# Patient Record
Sex: FEMALE | Race: WHITE | Employment: OTHER | URBAN - METROPOLITAN AREA
[De-identification: names, ages, dates, MRNs, and addresses within clinical notes are randomized per-mention and may not be internally consistent; named-entity substitution may affect disease eponyms.]

---

## 2017-04-07 DIAGNOSIS — I10 ESSENTIAL HYPERTENSION: ICD-10-CM

## 2017-04-07 DIAGNOSIS — I47.1 PAROXYSMAL SVT (SUPRAVENTRICULAR TACHYCARDIA) (HCC): ICD-10-CM

## 2017-04-07 DIAGNOSIS — I48.92 PAROXYSMAL ATRIAL FLUTTER (HCC): ICD-10-CM

## 2017-04-10 RX ORDER — FLECAINIDE ACETATE 50 MG/1
TABLET ORAL
Qty: 180 TAB | Refills: 3 | Status: SHIPPED | OUTPATIENT
Start: 2017-04-10 | End: 2018-03-24 | Stop reason: SDUPTHER

## 2017-05-24 ENCOUNTER — OFFICE VISIT (OUTPATIENT)
Dept: CARDIOLOGY CLINIC | Age: 82
End: 2017-05-24

## 2017-05-24 ENCOUNTER — CLINICAL SUPPORT (OUTPATIENT)
Dept: CARDIOLOGY CLINIC | Age: 82
End: 2017-05-24

## 2017-05-24 VITALS
HEIGHT: 67 IN | SYSTOLIC BLOOD PRESSURE: 156 MMHG | OXYGEN SATURATION: 98 % | HEART RATE: 61 BPM | DIASTOLIC BLOOD PRESSURE: 84 MMHG | BODY MASS INDEX: 23.23 KG/M2 | WEIGHT: 148 LBS | RESPIRATION RATE: 16 BRPM

## 2017-05-24 DIAGNOSIS — I10 ESSENTIAL HYPERTENSION: ICD-10-CM

## 2017-05-24 DIAGNOSIS — I48.92 PAROXYSMAL ATRIAL FLUTTER (HCC): ICD-10-CM

## 2017-05-24 DIAGNOSIS — I77.9 CAROTID DISEASE, BILATERAL (HCC): ICD-10-CM

## 2017-05-24 DIAGNOSIS — I47.1 PAROXYSMAL SVT (SUPRAVENTRICULAR TACHYCARDIA) (HCC): ICD-10-CM

## 2017-05-24 DIAGNOSIS — I11.9 BENIGN HYPERTENSIVE HEART DISEASE WITHOUT HEART FAILURE: Primary | ICD-10-CM

## 2017-05-24 NOTE — PATIENT INSTRUCTIONS
Please continue your Felcainide. Notify us if you begin to have an increase of palpitations or tachycardia. Please continue to keep yourself abreast of the research about ASA vs anticoagulation. Continue to follow up with Dr. Nereida Rodríguez about your nausea. Perhaps discuss the benefit of a GI evaluation. Consider seeing an ENT doctor to see if your dizziness is vertigo. Dr. Aguilera Backbone  Address: [facility] [address] [place] [address]   Phone: [phone]    We will see you again in 6 months, but please call us with any questions or concerns prior to your next office visit.

## 2017-05-24 NOTE — PROGRESS NOTES
[id number] [facility], [address] [facility]    Office [phone]  Fax [phone]      HISTORY OF PRESENT ILLNESS  Meggan Crowley is a [de-identified] y.o. female. Last seen 6 months ago. Problem List  Date Reviewed: 11/15/2016          Codes Class Noted    Essential hypertension ICD-10-CM: I10  ICD-9-CM: 401.9  1/7/2016        Paroxysmal atrial flutter ([address] ICD-10-CM: I48.92  ICD-9-CM: 427.32  9/29/2014        Carotid disease, bilateral ([address] ICD-10-CM: I77.9  ICD-9-CM: 447.9  3/27/2014        Paroxysmal SVT (supraventricular tachycardia) (HCC) ICD-10-CM: I47.1  ICD-9-CM: 427.0  3/23/2014        Benign hypertensive heart disease without heart failure ICD-10-CM: I11.9  ICD-9-CM: 402.10  Unknown             Cardiac testing:  Carotid duplex 5157 - GARRETT 40-62%, LICA 08-16%  Stress echo 2007 - 4:30, normal study  Carotid duplex 08/70/00 - 44-25% GARRETT, LICA 78-52%  Stress Echo 3/4/13 - normal study after submaximal exercise, diagnostic sensitivity limited by submaximal stress  Carotid 3/22/14-Occlusion and stenosis of carotid artery without mention of cerebral infarction: POA repeat bilateral artery doppler Consistent with <50% stenosis of the right internal carotid and 50-69% stenosis of the left internal carotid. Event Recorder 4/28/14, returned 6/3/14 - intermittent atrial flutter up to 151 bpm, PVCs  Echo 6/27/14: EF 65-70%; Left atrium - upper limits of normal.    Carotid duplex 3/9/44 - GARRETT 62-22%, LICA 45%, unchanged from 2014  Echo 4/12/16 - LVEF 65-70%, moderate MAC, mild MR, mild to moderate LAE  Carotid duplex 8/81/30 - GARRETT 20-58%, LICA 74%. Unchanged. HPI  Ms. Bill Hansen primary complaint today is nausea, abdominal pain and fatigue. She attributes these issues to a recent Lupus arthritis flare for which she required the use of Prednisone. She reports frequent follow up with Dr. Vishnu Del Valle to address these issues. She denies any progression in baseline dyspnea with moderate activity. She continues to report dizziness with position change. She denies any falls. Using a cane for stability. She denies any exertional anginal symptoms. She reports edema in her legs and ankles; worsens as the day progresses. She notes intermittent palpitations. No tachycardia. She denies syncope or near syncope. No orthopnea, or paroxysmal nocturnal dyspnea. Cardiac risk factors   HTN yes  DM  no  Smoking no; hx of tobacco.   Family hx of CAD no    Current Outpatient Prescriptions   Medication Sig Dispense Refill    flecainide (TAMBOCOR) 50 mg tablet TAKE 1 TABLET BY MOUTH TWICE DAILY 180 Tab 3    levothyroxine (SYNTHROID) 75 mcg tablet Take  by mouth Daily (before breakfast).  losartan (COZAAR) 100 mg tablet TAKE 1 TABLET BY MOUTH DAILY 90 Tab 3    omeprazole (PRILOSEC) 40 mg capsule Take 40 mg by mouth two (2) times a day.  propranolol LA (INDERAL LA) 160 mg capsule Take 160 mg by mouth daily.  ASPIRIN/DIPYRIDAMOLE (AGGRENOX PO) Take  by mouth two (2) times a day. 25-200mg Sr 12h       lorazepam (ATIVAN) 1 mg tablet Take 1 mg by mouth two (2) times a day.  acetaminophen (TYLENOL EXTRA STRENGTH) 500 mg tablet Take  by mouth every six (6) hours as needed for Pain. Allergies   Allergen Reactions    Cephalosporins Unknown (comments)    Flagyl [Metronidazole] Unknown (comments)    Iodine Unknown (comments)    Ivp Dye [Fd And C Blue No.1] Unknown (comments)    Keflex [Cephalexin] Unknown (comments)    Levaquin [Levofloxacin] Unknown (comments)    Morphine Other (comments)     Hallucinations, \"crazy\"    Pcn [Penicillins] Unknown (comments)    Sulfa (Sulfonamide Antibiotics) Unknown (comments)    Tetanus Vaccines And Toxoid Unknown (comments)    Tetracycline Unknown (comments)     Social History     Social History Narrative    . Remote smoker     Review of Systems   Constitutional: Negative for fever and diaphoresis.    Respiratory: Negative for shortness of breath, cough, hemoptysis, sputum production and wheezing. Cardiovascular: Negative for orthopnea, claudication, and PND. Positive for leg swelling, palpitations. Gastrointestinal: Negative for vomiting, blood in stool and melena. Positive for nausea and abdominal pain. Genitourinary: Negative for dysuria and flank pain. Musculoskeletal: Negative for joint and back pain. Skin: Negative for rash. Neurological: Negative for focal weakness, seizures, loss of consciousness, weakness and headaches. Positive for dizziness. Endo/Heme/Allergies: Does not bruise/bleed easily. Psychiatric/Behavioral: Positive for depression. Negative for memory loss. The patient is nervous/anxious. The patient does not have insomnia. There were no vitals taken for this visit. Wt Readings from Last 3 Encounters:   11/15/16 147 lb (66.7 kg)   04/12/16 140 lb (63.5 kg)   03/03/16 140 lb 9.6 oz (63.8 kg)      Physical Exam   Constitutional: She is oriented to person, place, and time. She appears well-developed and well-nourished. Neck: Neck supple. No JVD present. Cardiovascular: Normal rate, regular rhythm, S1 normal, S2 normal and normal heart sounds. Exam reveals no gallop. No murmur heard. Pulses:       Carotid pulses are 2+ on the right side with bruit, and 2+ on the left side. Dorsalis pedis pulses are 2+ on the right side, and 2+ on the left side. Pulmonary/Chest: Effort normal and breath sounds normal. She has no wheezes. She has no rales. Abdominal: Soft. Bowel sounds are normal. There is no tenderness. There is no rebound. Musculoskeletal: She exhibits no edema. Neurological: She is alert and oriented to person, place, and time. Skin: Skin is warm and dry.    Psychiatric: flat affect    Cardiographics  EKG 3/3/1/15 - SR 70, NSSTT abnormalities   EKG 1/7/16 - SR 63, normal  EKG 4/12/16 - SR 62, normal   EKG 11/15/16 - SR 66, unchanged  EKG 5/24/17 - SR 61, unchanged    ASSESSMENT and PLAN  Encounter Diagnoses   Name Primary?  Benign hypertensive heart disease without heart failure Yes    Paroxysmal SVT (supraventricular tachycardia) (HCC)     Carotid disease, bilateral (HCC)     Paroxysmal atrial flutter ([address]     Essential hypertension      Mrs. Burgos has longstanding atrial arrhythmias (PAF, SVT) s/p SVT ablation by Dr. Malick Moran at [address] 2015. She continues to report intermittent palpitations, currently on Flecainide for AF. [id number] 2-3, with a hx of CVA in the past. Again reviewed the benefit of anticoagulation vs. Antiplatelet therapy with Aggrenox alone, however she continues to wish to remain off anticoagulation. EKG in the office today SR. She does not wish to further evaluate for recurrent AF at this time. She continues to report chronic fatigue, likely multifactorial in the setting of Lupus, depression and deconditioning. Last ischemic evaluation 2013. Discussed how she may benefit from repeat ischemic evaluation to assess increased fatigue and dyspnea. She has refused this option at this time. Encouraged her to remain active with regular exercise and to monitor for progressive symptoms. BP mildly elevated in the office today. Amlodipine discontinued 6 months ago to address intermittent dizziness with abnormal orthostatics and lower extremity edema. Edema has resolved and orthostatics normal today in the office. She reports normal BP at home and at other providers visits. Plan to continue current regimen. Encouraged low sodium diet. The patient was encouraged to continue current medications, remain as active as possible, and call with any new complaints or concerns. Encouraged her to discuss ongoing nausea and abdominal pain with . [employer] again in the near future. Follow-up Disposition:  Return in about 6 months (around 11/24/2017).     DOROTHY Sanchez MD

## 2017-05-24 NOTE — MR AVS SNAPSHOT
Visit Information Date & Time Provider Department Dept. Phone Encounter #  
 5/24/2017 10:40 AM Faviola Dallas MD [facility] Shani Camacho [phone] [id number] Follow-up Instructions Return in about 6 months (around 11/24/2017). Upcoming Health Maintenance Date Due DTaP/Tdap/Td series (1 - Tdap) 1/2/1958 ZOSTER VACCINE AGE 60> 1/2/1997 GLAUCOMA SCREENING Q2Y 1/2/2002 OSTEOPOROSIS SCREENING (DEXA) 1/2/2002 Pneumococcal 65+ Low/Medium Risk (1 of 2 - PCV13) 1/2/2002 MEDICARE YEARLY EXAM 1/2/2002 INFLUENZA AGE 9 TO ADULT 8/1/2017 Allergies as of 5/24/2017  Review Complete On: 5/24/2017 By: Laura Oneill LPN Severity Noted Reaction Type Reactions Cephalosporins  11/10/2010    Unknown (comments) Flagyl [Metronidazole]  11/10/2010    Unknown (comments) Iodine  11/10/2010    Unknown (comments) Ivp Dye [Fd And C Blue No.1]  11/10/2010    Unknown (comments) Keflex [Cephalexin]  11/10/2010    Unknown (comments) Levaquin [Levofloxacin]  12/07/2012    Unknown (comments) Morphine  12/07/2012    Other (comments) Hallucinations, \"crazy\" Pcn [Penicillins]  11/10/2010    Unknown (comments) Sulfa (Sulfonamide Antibiotics)  11/10/2010    Unknown (comments) Tetanus Vaccines And Toxoid  11/10/2010    Unknown (comments) Tetracycline  11/10/2010    Unknown (comments) Current Immunizations  Never Reviewed No immunizations on file. Not reviewed this visit You Were Diagnosed With   
  
 Codes Comments Benign hypertensive heart disease without heart failure    -  Primary ICD-10-CM: I11.9 ICD-9-CM: 402.10 Paroxysmal SVT (supraventricular tachycardia) (HCC)     ICD-10-CM: I47.1 ICD-9-CM: 427.0 Carotid disease, bilateral ([facility]     ICD-10-CM: I77.9 ICD-9-CM: 447.9 Paroxysmal atrial flutter (HCC)     ICD-10-CM: I48.92 
ICD-9-CM: 427.32 Essential hypertension     ICD-10-CM: I10 
ICD-9-CM: 401.9 Vitals BP Pulse Resp Height(growth percentile) Weight(growth percentile) SpO2  
 156/84 (BP 1 Location: Left arm, BP Patient Position: Sitting) 61 16 5' 7\" (1.702 m) 148 lb (67.1 kg) 98% BMI OB Status Smoking Status 23.18 kg/m2 Postmenopausal Former Smoker BMI and BSA Data Body Mass Index Body Surface Area  
 23.18 kg/m 2 1.78 m 2 Preferred Pharmacy Pharmacy Name Phone [facility] [address] [place] [phone] [facility] [phone] Your Updated Medication List  
  
   
This list is accurate as of: 5/24/17 11:19 AM.  Always use your most recent med list.  
  
  
  
  
 Marilee Rush Take  by mouth two (2) times a day. 25-200mg Sr 12h  
  
 flecainide 50 mg tablet Commonly known as:  TAMBOCOR  
TAKE 1 TABLET BY MOUTH TWICE DAILY  
  
 levothyroxine 75 mcg tablet Commonly known as:  SYNTHROID Take  by mouth Daily (before breakfast). LORazepam 1 mg tablet Commonly known as:  ATIVAN Take 1 mg by mouth two (2) times a day. losartan 100 mg tablet Commonly known as:  COZAAR  
TAKE 1 TABLET BY MOUTH DAILY  
  
 omeprazole 40 mg capsule Commonly known as:  PRILOSEC Take 40 mg by mouth two (2) times a day. propranolol  mg capsule Commonly known as:  INDERAL LA Take 160 mg by mouth daily. TYLENOL EXTRA STRENGTH 500 mg tablet Generic drug:  acetaminophen Take  by mouth every six (6) hours as needed for Pain. We Performed the Following AMB POC EKG ROUTINE W/ 12 LEADS, INTER & REP [36304 CPT(R)] Follow-up Instructions Return in about 6 months (around 11/24/2017). Patient Instructions Please continue your Felcainide. Notify us if you begin to have an increase of palpitations or tachycardia. Please continue to keep yourself abreast of the research about ASA vs anticoagulation. Continue to follow up with Dr. Judith Aaron about your nausea. Perhaps discuss the benefit of a GI evaluation. Consider seeing an ENT doctor to see if your dizziness is vertigo. Dr. Rosie Alejandra Address: [facility], [facility], [place], [address] [place] [address] Phone: [phone] We will see you again in 6 months, but please call us with any questions or concerns prior to your next office visit. Introducing [facility] SERVICES! [employer] introduces [employer] patient portal. Now you can access parts of your medical record, email your doctor's office, and request medication refills online. 1. In your internet browser, go to [email or web address] 2. Click on the First Time User? Click Here link in the Sign In box. You will see the New Member Sign Up page. 3. Enter your [employer] Access Code exactly as it appears below. You will not need to use this code after youve completed the sign-up process. If you do not sign up before the expiration date, you must request a new code. · [employer] Access Code: [email or web address] Expires: 8/22/2017 11:19 AM 
 
4. Enter the last four digits of your Social Security Number (xxxx) and Date of Birth (mm/dd/yyyy) as indicated and click Submit. You will be taken to the next sign-up page. 5. Create a [employer] ID. This will be your [employer] login ID and cannot be changed, so think of one that is secure and easy to remember. 6. Create a [employer] password. You can change your password at any time. 7. Enter your Password Reset Question and Answer. This can be used at a later time if you forget your password. 8. Enter your e-mail address. You will receive e-mail notification when new information is available in [address] 9. Click Sign Up. You can now view and download portions of your medical record. 10. Click the Download Summary menu link to download a portable copy of your medical information. If you have questions, please visit the Frequently Asked Questions section of the [employer] website. Remember, [email or web address] is NOT to be used for urgent needs. For medical emergencies, dial 911. Now available from your iPhone and Android! Please provide this summary of care documentation to your next provider. Your primary care clinician is listed as Jose R Marx. If you have any questions after today's visit, please call [phone].

## 2017-05-24 NOTE — PROCEDURES
[facility]  *** FINAL REPORT ***    Name: Bee Pang  MRN: [id number]       Outpatient  : 1935  HIS Order #: [id number]  [address] [facility] Visit #: [id number]  Date: 24 May 2017    TYPE OF TEST: Cerebrovascular Duplex    REASON FOR TEST  Known carotid stenosis    Right Carotid:-             Proximal               Mid                 Distal  cm/s  Systolic  Diastolic  Systolic  Diastolic  Systolic  Diastolic  CCA:     21.9       9.0                            63.0       8.0  Bulb:    75.0      14.0  ECA:    313.0       0.0  ICA:     66.0      14.0       83.0      16.0       63.0      12.0  ICA/CCA:  1.0       1.8    ICA Stenosis: <50%    Right Vertebral:-  Finding: Antegrade  Sys:       42.0  Holly:        7.0    Right Subclavian:    Left Carotid:-            Proximal                Mid                 Distal  cm/s  Systolic  Diastolic  Systolic  Diastolic  Systolic  Diastolic  CCA:     90.3       7.0                            64.0       9.0  Bulb:   126.0      24.0  ECA:     87.0       0.0  ICA:    104.0      19.0       93.0      15.0       58.0      16.0  ICA/CCA:  1.6       2.1    ICA Stenosis: 50-69%    Left Vertebral:-  Finding: Antegrade  Sys:       37.0  Holly:        7.0    Left Subclavian:    INTERPRETATION/FINDINGS  PROCEDURE:  Evaluation of the extracranial cerebrovascular arteries  with ultrasound (B-mode imaging, pulsed Doppler, color Doppler). Includes the common carotid, internal carotid, external carotid, and  vertebral arteries. FINDINGS:    Rgith:  Mild, diffuse heterogeneous plaque is exhibited at the  bifurcation extending into the proximal internal carotid artery. Color flow imaging reveals a mild filling defect and peak systolic  velocities are recorded at 83 cm/s . A significant stenosis is  identified in the external carotid artery.     Left:  Mild to moderate mixed plaquing is visualized at the level of   the bifurcation extending into the proximal internal carotid artery. A filling defect is noted at the site of the plaque formation. Peak  systolic velocities are mildly elevated at 126 cm/s. IMPRESSION: Findings are consistent with 10-49%  stenosis of the right   internal carotid and a 50%  borderline stenosis of the left internal  carotid. >50% stenosis of the right ECA. Vertebrals are patent with  antegrade flow. Brachial pressures are symmetric. COMPARISON: In comparison to the previous study done on 1/7/2016,  there is no evidence of a significant progression of stenosis on  today's exam.  Preliminary findings were disccused with the patient at   an associated office visit. ADDITIONAL COMMENTS    I have personally reviewed the data relevant to the interpretation of  this  study.     TECHNOLOGIST: MERCED Jordan  Signed: 05/24/2017 11:00 AM    PHYSICIAN: Ez Silva MD  Signed: 05/30/2017 12:42 PM

## 2017-07-06 DIAGNOSIS — I47.1 PAROXYSMAL SVT (SUPRAVENTRICULAR TACHYCARDIA) (HCC): ICD-10-CM

## 2017-07-06 DIAGNOSIS — I48.92 PAROXYSMAL ATRIAL FLUTTER (HCC): ICD-10-CM

## 2017-07-06 DIAGNOSIS — I10 ESSENTIAL HYPERTENSION: ICD-10-CM

## 2017-07-10 RX ORDER — LOSARTAN POTASSIUM 100 MG/1
TABLET ORAL
Qty: 90 TAB | Refills: 0 | Status: SHIPPED | OUTPATIENT
Start: 2017-07-10 | End: 2017-10-03 | Stop reason: SDUPTHER

## 2017-10-03 DIAGNOSIS — I48.92 PAROXYSMAL ATRIAL FLUTTER (HCC): ICD-10-CM

## 2017-10-03 DIAGNOSIS — I10 ESSENTIAL HYPERTENSION: ICD-10-CM

## 2017-10-03 DIAGNOSIS — I47.1 PAROXYSMAL SVT (SUPRAVENTRICULAR TACHYCARDIA) (HCC): ICD-10-CM

## 2017-10-03 RX ORDER — LOSARTAN POTASSIUM 100 MG/1
TABLET ORAL
Qty: 90 TAB | Refills: 0 | Status: SHIPPED | OUTPATIENT
Start: 2017-10-03 | End: 2017-12-28 | Stop reason: SDUPTHER

## 2017-12-01 ENCOUNTER — OFFICE VISIT (OUTPATIENT)
Dept: CARDIOLOGY CLINIC | Age: 82
End: 2017-12-01

## 2017-12-01 VITALS
HEIGHT: 67 IN | SYSTOLIC BLOOD PRESSURE: 188 MMHG | HEART RATE: 72 BPM | WEIGHT: 146 LBS | DIASTOLIC BLOOD PRESSURE: 84 MMHG | RESPIRATION RATE: 14 BRPM | OXYGEN SATURATION: 99 % | BODY MASS INDEX: 22.91 KG/M2

## 2017-12-01 DIAGNOSIS — I77.9 CAROTID DISEASE, BILATERAL (HCC): ICD-10-CM

## 2017-12-01 DIAGNOSIS — I48.92 PAROXYSMAL ATRIAL FLUTTER (HCC): ICD-10-CM

## 2017-12-01 DIAGNOSIS — I47.1 PAROXYSMAL SVT (SUPRAVENTRICULAR TACHYCARDIA) (HCC): ICD-10-CM

## 2017-12-01 DIAGNOSIS — I11.9 BENIGN HYPERTENSIVE HEART DISEASE WITHOUT HEART FAILURE: Primary | ICD-10-CM

## 2017-12-01 RX ORDER — MAG HYDROX/ALUMINUM HYD/SIMETH 200-200-20
30 SUSPENSION, ORAL (FINAL DOSE FORM) ORAL DAILY
COMMUNITY

## 2017-12-01 RX ORDER — ERYTHROMYCIN 5 MG/G
OINTMENT OPHTHALMIC
COMMUNITY

## 2017-12-01 NOTE — PROGRESS NOTES
Bucyk Medley is a [de-identified] y.o. female  Chief Complaint   Patient presents with    Hypertension     1. Have you been to the ER, urgent care clinic since your last visit? Hospitalized since your last visit? No    2. Have you seen or consulted any other health care providers outside of the [address] [place] [address] since your last visit? Include any pap smears or colon screening. Dr. Chiquis Henderson, PCP, last seen 3 weeks ago for sodium level.

## 2017-12-01 NOTE — MR AVS SNAPSHOT
Visit Information Date & Time Provider Department Dept. Phone Encounter #  
 12/1/2017  1:40 PM Sharda Dubose MD [facility] Karon Lo [phone] [id number] Follow-up Instructions Return in about 6 months (around 6/1/2018). Upcoming Health Maintenance Date Due DTaP/Tdap/Td series (1 - Tdap) 1/2/1958 ZOSTER VACCINE AGE 60> 11/2/1996 GLAUCOMA SCREENING Q2Y 1/2/2002 OSTEOPOROSIS SCREENING (DEXA) 1/2/2002 Pneumococcal 65+ Low/Medium Risk (1 of 2 - PCV13) 1/2/2002 MEDICARE YEARLY EXAM 1/2/2002 Influenza Age 5 to Adult 8/1/2017 Allergies as of 12/1/2017  Review Complete On: 12/1/2017 By: Katie Weinberg LPN Severity Noted Reaction Type Reactions Cephalosporins  11/10/2010    Unknown (comments) Flagyl [Metronidazole]  11/10/2010    Unknown (comments) Iodine  11/10/2010    Unknown (comments) Ivp Dye [Fd And C Blue No.1]  11/10/2010    Unknown (comments) Keflex [Cephalexin]  11/10/2010    Unknown (comments) Levaquin [Levofloxacin]  12/07/2012    Unknown (comments) Morphine  12/07/2012    Other (comments) Hallucinations, \"crazy\" Pcn [Penicillins]  11/10/2010    Unknown (comments) Sulfa (Sulfonamide Antibiotics)  11/10/2010    Unknown (comments) Tetanus Vaccines And Toxoid  11/10/2010    Unknown (comments) Tetracycline  11/10/2010    Unknown (comments) Current Immunizations  Never Reviewed No immunizations on file. Not reviewed this visit You Were Diagnosed With   
  
 Codes Comments Benign hypertensive heart disease without heart failure    -  Primary ICD-10-CM: I11.9 ICD-9-CM: 402.10 Paroxysmal SVT (supraventricular tachycardia) (HCC)     ICD-10-CM: I47.1 ICD-9-CM: 427.0 Paroxysmal atrial flutter (HCC)     ICD-10-CM: I48.92 
ICD-9-CM: 427.32 Essential hypertension     ICD-10-CM: I10 
ICD-9-CM: 401.9 Carotid disease, bilateral ([facility] [address]     ICD-10-CM: I77.9 ICD-9-CM: 534. 9 Vitals BP Pulse Resp Height(growth percentile) Weight(growth percentile) SpO2  
 188/84 (BP 1 Location: Left arm, BP Patient Position: Sitting) 72 14 5' 7\" (1.702 m) 146 lb (66.2 kg) 99% BMI OB Status Smoking Status 22.87 kg/m2 Postmenopausal Former Smoker Vitals History BMI and BSA Data Body Mass Index Body Surface Area  
 22.87 kg/m 2 1.77 m 2 Preferred Pharmacy Pharmacy Name Phone [facility] [address], [address] [facility] [address] [place] [address] [address] [phone] Your Updated Medication List  
  
   
This list is accurate as of: 12/1/17  2:20 PM.  Always use your most recent med list.  
  
  
  
  
 Reford Leland Take  by mouth two (2) times a day. 25-200mg Sr 12h  
  
 alum-mag hydroxide-simeth 200-200-20 mg/5 mL Susp Commonly known as:  MYLANTA Take 30 mL by mouth daily. erythromycin ophthalmic ointment Commonly known as:  ILOTYCIN Administer  to both eyes nightly. flecainide 50 mg tablet Commonly known as:  TAMBOCOR  
TAKE 1 TABLET BY MOUTH TWICE DAILY  
  
 levothyroxine 75 mcg tablet Commonly known as:  SYNTHROID Take 100 mcg by mouth Daily (before breakfast). LORazepam 1 mg tablet Commonly known as:  ATIVAN Take 1 mg by mouth two (2) times a day. losartan 100 mg tablet Commonly known as:  COZAAR  
TAKE 1 TABLET BY MOUTH DAILY  
  
 omeprazole 40 mg capsule Commonly known as:  PRILOSEC Take 40 mg by mouth two (2) times a day. propranolol  mg capsule Commonly known as:  INDERAL LA Take 160 mg by mouth daily. TYLENOL EXTRA STRENGTH 500 mg tablet Generic drug:  acetaminophen Take  by mouth every six (6) hours as needed for Pain. Follow-up Instructions Return in about 6 months (around 6/1/2018). Introducing [facility]!    
 Bailey Richardson introduces [employer] patient portal. Now you can access parts of your medical record, email your doctor's office, and request medication refills online. 1. In your internet browser, go to [email or web address]/"[email or web address] 2. Click on the First Time User? Click Here link in the Sign In box. You will see the New Member Sign Up page. 3. Enter your ???? Access Code exactly as it appears below. You will not need to use this code after youve completed the sign-up process. If you do not sign up before the expiration date, you must request a new code. · ???? Access Code: [email or web address] Expires: 3/1/2018  2:20 PM 
 
4. Enter the last four digits of your Social Security Number (xxxx) and Date of Birth (mm/dd/yyyy) as indicated and click Submit. You will be taken to the next sign-up page. 5. Create a ???? ID. This will be your ???? login ID and cannot be changed, so think of one that is secure and easy to remember. 6. Create a ???? password. You can change your password at any time. 7. Enter your Password Reset Question and Answer. This can be used at a later time if you forget your password. 8. Enter your e-mail address. You will receive e-mail notification when new information is available in [address] 9. Click Sign Up. You can now view and download portions of your medical record. 10. Click the Download Summary menu link to download a portable copy of your medical information. If you have questions, please visit the Frequently Asked Questions section of the ???? website. Remember, ???? is NOT to be used for urgent needs. For medical emergencies, dial 911. Now available from your iPhone and Android! Please provide this summary of care documentation to your next provider. Your primary care clinician is listed as Ava Malik. If you have any questions after today's visit, please call [phone].

## 2017-12-01 NOTE — PROGRESS NOTES
[address] [facility]  [place], [address] [facility]    Office [phone]  Fax [phone]      HISTORY OF PRESENT ILLNESS  Eugene Walker is a [de-identified] y.o. female. Last seen 6 months ago. Problem List  Date Reviewed: 5/30/2017          Codes Class Noted    Paroxysmal atrial flutter ([facility] [address] ICD-10-CM: I48.92  ICD-9-CM: 427.32  9/29/2014        Carotid disease, bilateral ([address] ICD-10-CM: I77.9  ICD-9-CM: 447.9  3/27/2014        Paroxysmal SVT (supraventricular tachycardia) (HCC) ICD-10-CM: I47.1  ICD-9-CM: 427.0  3/23/2014        Benign hypertensive heart disease without heart failure ICD-10-CM: I11.9  ICD-9-CM: 402.10  Unknown             Cardiac testing:  Carotid duplex 6943 - GARRETT 42-97%, LICA 15-27%  Stress echo 2007 - 4:30, normal study  Carotid duplex 95/68/64 - 92-80% GARRETT, LICA 52-60%  Stress Echo 3/4/13 - normal study after submaximal exercise, diagnostic sensitivity limited by submaximal stress  Carotid 3/22/14-Occlusion and stenosis of carotid artery without mention of cerebral infarction: POA repeat bilateral artery doppler Consistent with <50% stenosis of the right internal carotid and 50-69% stenosis of the left internal carotid. Event Recorder 4/28/14, returned 6/3/14 - intermittent atrial flutter up to 151 bpm, PVCs  Echo 6/27/14: EF 65-70%; Left atrium - upper limits of normal.    Carotid duplex 0/7/38 - GARRETT 51-44%, LICA 07%, unchanged from 2014  Echo 4/12/16 - LVEF 65-70%, moderate MAC, mild MR, mild to moderate LAE  Carotid duplex 1/27/56 - GARRETT 57-53%, LICA 22%. Unchanged. HPI  She has had a sore throat and cough for 2 weeks and notes this is making her tense. She notes BP at PCP's office a last week ago was 148/70. She has had some ROCHA for the past 2 weeks, but only had ROCHA intermittently prior to that. She also reports intermittent skipped beat palpitations similar to her palpitations prior to ablation.  She states she would prefer not to do anything for palpitations at the time. Her HR is typically in the 60s including when she notices palpitations. Patient denies any exertional chest pain, syncope, orthopnea, edema or paroxysmal nocturnal dyspnea. She was recently found to be hyponatremic and has been on fluid restriction under Dr. Roz Hou guidance. Cardiac risk factors   HTN yes  DM  no  Smoking no; hx of tobacco.   Family hx of CAD no    Current Outpatient Prescriptions   Medication Sig Dispense Refill    alum-mag hydroxide-simeth (MYLANTA) 200-200-20 mg/5 mL susp Take 30 mL by mouth daily.  erythromycin (ILOTYCIN) ophthalmic ointment Administer  to both eyes nightly.  losartan (COZAAR) 100 mg tablet TAKE 1 TABLET BY MOUTH DAILY 90 Tab 0    flecainide (TAMBOCOR) 50 mg tablet TAKE 1 TABLET BY MOUTH TWICE DAILY 180 Tab 3    levothyroxine (SYNTHROID) 75 mcg tablet Take 100 mcg by mouth Daily (before breakfast).  acetaminophen (TYLENOL EXTRA STRENGTH) 500 mg tablet Take  by mouth every six (6) hours as needed for Pain.  omeprazole (PRILOSEC) 40 mg capsule Take 40 mg by mouth two (2) times a day.  propranolol LA (INDERAL LA) 160 mg capsule Take 160 mg by mouth daily.  ASPIRIN/DIPYRIDAMOLE (AGGRENOX PO) Take  by mouth two (2) times a day. 25-200mg Sr 12h       lorazepam (ATIVAN) 1 mg tablet Take 1 mg by mouth two (2) times a day. Allergies   Allergen Reactions    Cephalosporins Unknown (comments)    Flagyl [Metronidazole] Unknown (comments)    Iodine Unknown (comments)    Ivp Dye [Fd And C Blue No.1] Unknown (comments)    Keflex [Cephalexin] Unknown (comments)    Levaquin [Levofloxacin] Unknown (comments)    Morphine Other (comments)     Hallucinations, \"crazy\"    Pcn [Penicillins] Unknown (comments)    Sulfa (Sulfonamide Antibiotics) Unknown (comments)    Tetanus Vaccines And Toxoid Unknown (comments)    Tetracycline Unknown (comments)     Social History     Social History Narrative    .  Remote smoker     Review of Systems   Constitutional: Negative for fever and diaphoresis. HEENT: +sore throat  Respiratory: Negative for  hemoptysis, sputum production and wheezing. +ROCHA, +cough  Cardiovascular: Negative for orthopnea, claudication, and PND. +skipped beat palpitations   Gastrointestinal: Negative for vomiting, blood in stool and melena. Genitourinary: Negative for dysuria and flank pain. Musculoskeletal: Negative for joint and back pain. Skin: Negative for rash. Neurological: Negative for focal weakness, seizures, loss of consciousness, weakness and headaches. Endo/Heme/Allergies: Does not bruise/bleed easily. Psychiatric/Behavioral:  Negative for memory loss. The patient does not have insomnia. Visit Vitals    /84 (BP 1 Location: Left arm, BP Patient Position: Sitting)    Pulse 72    Resp 14    Ht 5' 7\" (1.702 m)    Wt 146 lb (66.2 kg)    SpO2 99%    BMI 22.87 kg/m2     Wt Readings from Last 3 Encounters:   12/01/17 146 lb (66.2 kg)   05/24/17 148 lb (67.1 kg)   11/15/16 147 lb (66.7 kg)      Physical Exam   Constitutional: She is oriented to person, place, and time. She appears well-developed and well-nourished. Neck: Neck supple. No JVD present. Cardiovascular: Normal rate, regular rhythm, S1 normal, S2 normal and normal heart sounds. Exam reveals no gallop. No murmur heard. Pulses:       Carotid pulses are 2+ on the right side with bruit, and 2+ on the left side. Dorsalis pedis pulses are 2+ on the right side, and 2+ on the left side. Pulmonary/Chest: Effort normal and breath sounds normal. She has no wheezes. She has no rales. Abdominal: Soft. Bowel sounds are normal. There is no tenderness. There is no rebound. Musculoskeletal: She exhibits no edema. Neurological: She is alert and oriented to person, place, and time. Skin: Skin is warm and dry.    Psychiatric: flat affect    Cardiographics  EKG 3/3/1/15 - SR 70, NSSTT abnormalities   EKG 1/7/16 - SR 63, normal  EKG 4/12/16 - SR 62, normal   EKG 11/15/16 - SR 66, unchanged  EKG 5/24/17 - SR 61, unchanged    ASSESSMENT and PLAN  Encounter Diagnoses   Name Primary?  Benign hypertensive heart disease without heart failure Yes    Paroxysmal SVT (supraventricular tachycardia) (HCC)     Paroxysmal atrial flutter (HCC)     Carotid disease, bilateral [facility])      Mrs. Amrit You has longstanding atrial arrhythmias (PAF, SVT) s/p SVT ablation by Dr. Jesu Savage at [address] 2015. She has had no tachycardia on Flecainide. She continues with antiplatelet therapy alone at her request.     High systolic BP in the office, normotensive at home. Continue current treatment. Update carotid duplex in 6 months. She is slowly recovering from a URI. Follow-up Disposition:  Return in about 6 months (around 6/1/2018).   With carotid duplex    Written by Olivia Grullon, dictated by Fahad Huerta MD.  Fahad Huerta MD

## 2017-12-28 DIAGNOSIS — I48.92 PAROXYSMAL ATRIAL FLUTTER (HCC): ICD-10-CM

## 2017-12-28 DIAGNOSIS — I47.1 PAROXYSMAL SVT (SUPRAVENTRICULAR TACHYCARDIA) (HCC): ICD-10-CM

## 2017-12-28 DIAGNOSIS — I10 ESSENTIAL HYPERTENSION: ICD-10-CM

## 2017-12-28 RX ORDER — LOSARTAN POTASSIUM 100 MG/1
TABLET ORAL
Qty: 90 TAB | Refills: 3 | Status: SHIPPED | OUTPATIENT
Start: 2017-12-28 | End: 2019-01-01 | Stop reason: SDUPTHER

## 2018-09-12 ENCOUNTER — OFFICE VISIT (OUTPATIENT)
Dept: CARDIOLOGY CLINIC | Age: 83
End: 2018-09-12

## 2018-09-12 ENCOUNTER — CLINICAL SUPPORT (OUTPATIENT)
Dept: CARDIOLOGY CLINIC | Age: 83
End: 2018-09-12

## 2018-09-12 VITALS
HEIGHT: 67 IN | OXYGEN SATURATION: 95 % | RESPIRATION RATE: 18 BRPM | WEIGHT: 135.6 LBS | HEART RATE: 61 BPM | SYSTOLIC BLOOD PRESSURE: 140 MMHG | DIASTOLIC BLOOD PRESSURE: 84 MMHG | BODY MASS INDEX: 21.28 KG/M2

## 2018-09-12 DIAGNOSIS — I77.9 CAROTID DISEASE, BILATERAL (HCC): ICD-10-CM

## 2018-09-12 DIAGNOSIS — I48.92 ATRIAL FLUTTER, UNSPECIFIED TYPE (HCC): Primary | ICD-10-CM

## 2018-09-12 DIAGNOSIS — I10 ESSENTIAL HYPERTENSION: ICD-10-CM

## 2018-09-12 DIAGNOSIS — I47.1 PAROXYSMAL SVT (SUPRAVENTRICULAR TACHYCARDIA) (HCC): ICD-10-CM

## 2018-09-12 DIAGNOSIS — R06.09 DOE (DYSPNEA ON EXERTION): ICD-10-CM

## 2018-09-12 DIAGNOSIS — I48.92 PAROXYSMAL ATRIAL FLUTTER (HCC): Primary | ICD-10-CM

## 2018-09-12 RX ORDER — ASPIRIN AND DIPYRIDAMOLE 25; 200 MG/1; MG/1
1 CAPSULE, EXTENDED RELEASE ORAL 2 TIMES DAILY
COMMUNITY

## 2018-09-12 RX ORDER — LEVOTHYROXINE SODIUM 100 UG/1
TABLET ORAL
COMMUNITY

## 2018-09-12 NOTE — MR AVS SNAPSHOT
[address] [facility] [address] [facility] 
[phone] Patient: Rick Domingo MRN: [id number] CUM:0/1/9986 Visit Information Date & Time Provider Department Dept. Phone Encounter #  
 9/12/2018 11:00 AM Dany Esparza MD [facility] [phone] [id number] Follow-up Instructions Return in about 6 months (around 3/12/2019). Upcoming Health Maintenance Date Due DTaP/Tdap/Td series (1 - Tdap) 1/2/1956 ZOSTER VACCINE AGE 60> 11/2/1996 GLAUCOMA SCREENING Q2Y 1/2/2002 Bone Densitometry (Dexa) Screening 1/2/2002 Pneumococcal 65+ Low/Medium Risk (1 of 2 - PCV13) 1/2/2002 MEDICARE YEARLY EXAM 3/14/2018 Influenza Age 5 to Adult 8/1/2018 Allergies as of 9/12/2018  Review Complete On: 9/12/2018 By: Victor M Linton Severity Noted Reaction Type Reactions Cephalosporins  11/10/2010    Unknown (comments) Flagyl [Metronidazole]  11/10/2010    Unknown (comments) Iodine  11/10/2010    Unknown (comments) Ivp Dye [Fd And C Blue No.1]  11/10/2010    Unknown (comments) Keflex [Cephalexin]  11/10/2010    Unknown (comments) Levaquin [Levofloxacin]  12/07/2012    Unknown (comments) Morphine  12/07/2012    Other (comments) Hallucinations, \"crazy\" Pcn [Penicillins]  11/10/2010    Unknown (comments) Sulfa (Sulfonamide Antibiotics)  11/10/2010    Unknown (comments) Tetanus Vaccines And Toxoid  11/10/2010    Unknown (comments) Tetracycline  11/10/2010    Unknown (comments) Current Immunizations  Never Reviewed No immunizations on file. Not reviewed this visit You Were Diagnosed With   
  
 Codes Comments Paroxysmal atrial flutter (HCC)    -  Primary ICD-10-CM: I48.92 
ICD-9-CM: 427.32 Essential hypertension     ICD-10-CM: I10 
ICD-9-CM: 401.9 Paroxysmal SVT (supraventricular tachycardia) (HCC)     ICD-10-CM: I47.1 ICD-9-CM: 427.0 Carotid disease, bilateral ([facility] [address]     ICD-10-CM: I77.9 ICD-9-CM: 217. 9 Vitals BP Pulse Resp Height(growth percentile) Weight(growth percentile) SpO2  
 140/84 (BP 1 Location: Left arm) 61 18 5' 7\" (1.702 m) 135 lb 9.6 oz (61.5 kg) 95% BMI OB Status Smoking Status 21.24 kg/m2 Postmenopausal Former Smoker Vitals History BMI and BSA Data Body Mass Index Body Surface Area  
 21.24 kg/m 2 1.71 m 2 Preferred Pharmacy Pharmacy Name Phone [facility] [address] [place] [address] [phone] Your Updated Medication List  
  
   
This list is accurate as of 9/12/18 11:14 AM.  Always use your most recent med list.  
  
  
  
  
 AGGRENOX  mg per SR capsule Generic drug:  aspirin-dipyridamole Take 1 Cap by mouth two (2) times a day. alum-mag hydroxide-simeth 200-200-20 mg/5 mL Susp Commonly known as:  MYLANTA Take 30 mL by mouth daily. erythromycin ophthalmic ointment Commonly known as:  ILOTYCIN Administer  to both eyes nightly. flecainide 50 mg tablet Commonly known as:  TAMBOCOR  
TAKE 1 TABLET BY MOUTH TWICE DAILY * levothyroxine 75 mcg tablet Commonly known as:  SYNTHROID Take 75 mcg by mouth Daily (before breakfast). * SYNTHROID 100 mcg tablet Generic drug:  levothyroxine Take  by mouth Daily (before breakfast). LORazepam 1 mg tablet Commonly known as:  ATIVAN Take 1 mg by mouth two (2) times a day. losartan 100 mg tablet Commonly known as:  COZAAR  
TAKE 1 TABLET BY MOUTH DAILY  
  
 omeprazole 40 mg capsule Commonly known as:  PRILOSEC Take 40 mg by mouth two (2) times a day. propranolol  mg capsule Commonly known as:  INDERAL LA Take 160 mg by mouth daily. TYLENOL EXTRA STRENGTH 500 mg tablet Generic drug:  acetaminophen Take  by mouth every six (6) hours as needed for Pain. * Notice: This list has 2 medication(s) that are the same as other medications prescribed for you. Read the directions carefully, and ask your doctor or other care provider to review them with you. We Performed the Following AMB POC EKG ROUTINE W/ 12 LEADS, INTER & REP [56810 CPT(R)] Follow-up Instructions Return in about 6 months (around 3/12/2019). Introducing [facility] SERVICES! Lucille Liang introduces [employer] patient portal. Now you can access parts of your medical record, email your doctor's office, and request medication refills online. 1. In your internet browser, go to [email or web address] 2. Click on the First Time User? Click Here link in the Sign In box. You will see the New Member Sign Up page. 3. Enter your [employer] Access Code exactly as it appears below. You will not need to use this code after youve completed the sign-up process. If you do not sign up before the expiration date, you must request a new code. · [employer] Access Code: [email or web address] Expires: 12/11/2018 11:13 AM 
 
4. Enter the last four digits of your Social Security Number (xxxx) and Date of Birth (mm/dd/yyyy) as indicated and click Submit. You will be taken to the next sign-up page. 5. Create a [employer] ID. This will be your [employer] login ID and cannot be changed, so think of one that is secure and easy to remember. 6. Create a [employer] password. You can change your password at any time. 7. Enter your Password Reset Question and Answer. This can be used at a later time if you forget your password. 8. Enter your e-mail address. You will receive e-mail notification when new information is available in [address] 9. Click Sign Up. You can now view and download portions of your medical record. 10. Click the Download Summary menu link to download a portable copy of your medical information. If you have questions, please visit the Frequently Asked Questions section of the [employer] website. Remember, [employer] is NOT to be used for urgent needs. For medical emergencies, dial 911. Now available from your iPhone and Android! Please provide this summary of care documentation to your next provider. Your primary care clinician is listed as Jillian Almaraz. If you have any questions after today's visit, please call [phone].

## 2018-09-12 NOTE — PROGRESS NOTES
[address] [facility], [address] [facility]    Office [phone]  Fax [phone]      HISTORY OF PRESENT ILLNESS  Brandy Weldon is a 82 y.o. female. Last seen 9 months ago. Problem List  Date Reviewed: 5/30/2017          Codes Class Noted    Paroxysmal atrial flutter ([facility] [address] ICD-10-CM: I48.92  ICD-9-CM: 427.32  9/29/2014        Carotid disease, bilateral ([address] ICD-10-CM: I77.9  ICD-9-CM: 447.9  3/27/2014        Paroxysmal SVT (supraventricular tachycardia) (HCC) ICD-10-CM: I47.1  ICD-9-CM: 427.0  3/23/2014        Benign hypertensive heart disease without heart failure ICD-10-CM: I11.9  ICD-9-CM: 402.10  Unknown             Cardiac testing:  Carotid duplex 3663 - GARRETT 77-15%, LICA 65-30%  Stress echo 2007 - 4:30, normal study  Carotid duplex 84/00/46 - 99-65% GARRETT, LICA 60-09%  Stress Echo 3/4/13 - normal study after submaximal exercise, diagnostic sensitivity limited by submaximal stress  Carotid 3/22/14-Occlusion and stenosis of carotid artery without mention of cerebral infarction: POA repeat bilateral artery doppler Consistent with <50% stenosis of the right internal carotid and 50-69% stenosis of the left internal carotid. Event Recorder 4/28/14, returned 6/3/14 - intermittent atrial flutter up to 151 bpm, PVCs  Echo 6/27/14: EF 65-70%; Left atrium - upper limits of normal.    Carotid duplex 9/6/03 - GARRETT 33-86%, LICA 81%, unchanged from 2014  Echo 4/12/16 - LVEF 65-70%, moderate MAC, mild MR, mild to moderate LAE  Carotid duplex 0/38/16 - GARRETT 92-96%, LICA 24%. Unchanged. Echo 9/12/18 - LVH, EF 60%, moderate LAE  EKG 9/12/18 - SR 61, poor R wave progression, nonspecific STT abnormalities, no significant change from 5/24/17    HPI  Ms. Jaye Church states she is doing well. She occasionally has some skipped heartbeat, but states they do not last more than three beats. She has not been exercising due to bilateral leg weakness, with R > L s/p stroke.  She tries to do housework, but becomes fatigued easily. She also has worsening Lupus, followed by  [facility]. She states her sodium has been low in the past, but was normal on last check. Patient denies any exertional chest pain, dyspnea,  syncope, orthopnea, edema or paroxysmal nocturnal dyspnea. Cardiac risk factors   HTN yes  DM  no  Smoking no; hx of tobacco.   Family hx of CAD no    Current Outpatient Prescriptions   Medication Sig Dispense Refill    aspirin-dipyridamole (AGGRENOX)  mg per SR capsule Take 1 Cap by mouth two (2) times a day.  levothyroxine (SYNTHROID) 100 mcg tablet Take  by mouth Daily (before breakfast).  flecainide (TAMBOCOR) 50 mg tablet TAKE 1 TABLET BY MOUTH TWICE DAILY 180 Tab 3    losartan (COZAAR) 100 mg tablet TAKE 1 TABLET BY MOUTH DAILY 90 Tab 3    alum-mag hydroxide-simeth (MYLANTA) 200-200-20 mg/5 mL susp Take 30 mL by mouth daily.  erythromycin (ILOTYCIN) ophthalmic ointment Administer  to both eyes nightly.  acetaminophen (TYLENOL EXTRA STRENGTH) 500 mg tablet Take  by mouth every six (6) hours as needed for Pain.  omeprazole (PRILOSEC) 40 mg capsule Take 40 mg by mouth two (2) times a day.  propranolol LA (INDERAL LA) 160 mg capsule Take 160 mg by mouth daily.  lorazepam (ATIVAN) 1 mg tablet Take 1 mg by mouth two (2) times a day.  levothyroxine (SYNTHROID) 75 mcg tablet Take 75 mcg by mouth Daily (before breakfast).        Allergies   Allergen Reactions    Cephalosporins Unknown (comments)    Flagyl [Metronidazole] Unknown (comments)    Iodine Unknown (comments)    Ivp Dye [Fd And C Blue No.1] Unknown (comments)    Keflex [Cephalexin] Unknown (comments)    Levaquin [Levofloxacin] Unknown (comments)    Morphine Other (comments)     Hallucinations, \"crazy\"    Pcn [Penicillins] Unknown (comments)    Sulfa (Sulfonamide Antibiotics) Unknown (comments)    Tetanus Vaccines And Toxoid Unknown (comments)    Tetracycline Unknown (comments)     Social History     Social History Narrative    . Remote smoker     Review of Systems   Constitutional: Negative for fever and diaphoresis. +fatigue  Respiratory: Negative for  hemoptysis, sputum production and wheezing. Cardiovascular: Negative for orthopnea, claudication, and PND. +skipped beat palpitations   Gastrointestinal: Negative for vomiting, blood in stool and melena. Genitourinary: Negative for dysuria and flank pain. Musculoskeletal: Negative for joint and back pain. Skin: Negative for rash. Neurological: Negative for focal weakness, seizures, loss of consciousness, weakness and headaches. Endo/Heme/Allergies: Does not bruise/bleed easily. Psychiatric/Behavioral:  Negative for memory loss. The patient does not have insomnia. Visit Vitals    /84 (BP 1 Location: Left arm)  Comment: per echo tech    Pulse 61    Resp 18    Ht 5' 7\" (1.702 m)    Wt 135 lb 9.6 oz (61.5 kg)    SpO2 95%    BMI 21.24 kg/m2     Wt Readings from Last 3 Encounters:   09/12/18 135 lb 9.6 oz (61.5 kg)   12/01/17 146 lb (66.2 kg)   05/24/17 148 lb (67.1 kg)      Physical Exam   Constitutional: She is oriented to person, place, and time. She appears well-developed and well-nourished. Neck: Neck supple. No JVD present. Cardiovascular: Normal rate, regular rhythm, S1 normal, S2 normal and normal heart sounds. Exam reveals no gallop. No murmur heard. Pulses:       Carotid pulses are 2+ on the right side with bruit, and 2+ on the left side. Dorsalis pedis pulses are 2+ on the right side, and 2+ on the left side. Pulmonary/Chest: Effort normal and breath sounds normal. She has no wheezes. She has no rales. Abdominal: Soft. Bowel sounds are normal. There is no tenderness. There is no rebound. Musculoskeletal: She exhibits no edema. Neurological: She is alert and oriented to person, place, and time. Skin: Skin is warm and dry.    Psychiatric: flat affect    Cardiographics  EKG 3/3/1/15 - SR 70, NSSTT abnormalities   EKG 1/7/16 - SR 63, normal  EKG 4/12/16 - SR 62, normal   EKG 11/15/16 - SR 66, unchanged  EKG 5/24/17 - SR 61, unchanged  Echo 9/12/18 - LVH, EF 60%, moderate LAE  EKG 9/12/18 - SR 61, poor R wave progression, nonspecific STT abnormalities, no significant change from 5/24/17    ASSESSMENT and PLAN  Encounter Diagnoses   Name Primary?  Paroxysmal atrial flutter (HCC) Yes    Essential hypertension     Paroxysmal SVT (supraventricular tachycardia) (HCC)     Carotid disease, bilateral [facility])      Mrs. Dax Blas has longstanding atrial arrhythmias (PAF, SVT) s/p SVT ablation by Dr. Talha Naranjo at [address] [facility] 2015. She has had no recurrent tachycardia on Flecainide. She continues with antiplatelet therapy alone at her request.     HTN, controlled on combination therapy. Carotid disease with remote lacunar infarct. Update carotid duplex in 6 months. Her functional capacity is limited by fatigue and Lupus. Follow-up Disposition:  Return in about 6 months (around 3/12/2019). Written by Franki Ellis, as dictated by Dr. Brandy Govea.      Brandy Govea MD

## 2019-01-01 DIAGNOSIS — I47.1 PAROXYSMAL SVT (SUPRAVENTRICULAR TACHYCARDIA) (HCC): ICD-10-CM

## 2019-01-01 DIAGNOSIS — I10 ESSENTIAL HYPERTENSION: ICD-10-CM

## 2019-01-01 DIAGNOSIS — I48.92 PAROXYSMAL ATRIAL FLUTTER (HCC): ICD-10-CM

## 2019-01-02 RX ORDER — LOSARTAN POTASSIUM 100 MG/1
TABLET ORAL
Qty: 90 TAB | Refills: 1 | Status: SHIPPED | OUTPATIENT
Start: 2019-01-02 | End: 2019-06-25 | Stop reason: SDUPTHER

## 2019-03-05 ENCOUNTER — TELEPHONE (OUTPATIENT)
Dept: CARDIOLOGY CLINIC | Age: 84
End: 2019-03-05

## 2019-03-05 NOTE — TELEPHONE ENCOUNTER
Patient unable to do Carotid study for tomorrow with appt but wants to come in to see Kd Mathis anyway and have Carotid study another time. Awilda can you make this carotid appt?   Thank you

## 2019-03-06 ENCOUNTER — OFFICE VISIT (OUTPATIENT)
Dept: CARDIOLOGY CLINIC | Age: 84
End: 2019-03-06

## 2019-03-06 VITALS
DIASTOLIC BLOOD PRESSURE: 80 MMHG | WEIGHT: 132.2 LBS | HEIGHT: 67 IN | BODY MASS INDEX: 20.75 KG/M2 | RESPIRATION RATE: 16 BRPM | HEART RATE: 72 BPM | SYSTOLIC BLOOD PRESSURE: 154 MMHG

## 2019-03-06 DIAGNOSIS — I48.92 PAROXYSMAL ATRIAL FLUTTER (HCC): ICD-10-CM

## 2019-03-06 DIAGNOSIS — I65.23 BILATERAL CAROTID ARTERY STENOSIS: ICD-10-CM

## 2019-03-06 DIAGNOSIS — I47.1 PAROXYSMAL SVT (SUPRAVENTRICULAR TACHYCARDIA) (HCC): ICD-10-CM

## 2019-03-06 DIAGNOSIS — I10 ESSENTIAL HYPERTENSION: Primary | ICD-10-CM

## 2019-03-06 NOTE — PROGRESS NOTES
Visit Vitals  /80 (BP 1 Location: Left arm)   Pulse 72   Resp 16   Ht 5' 7\" (1.702 m)   Wt 132 lb 3.2 oz (60 kg)   BMI 20.71 kg/m²       Patient is here for follow up. Doing okay. No specific complaints.

## 2019-06-25 DIAGNOSIS — I48.92 PAROXYSMAL ATRIAL FLUTTER (HCC): ICD-10-CM

## 2019-06-25 DIAGNOSIS — I10 ESSENTIAL HYPERTENSION: ICD-10-CM

## 2019-06-25 DIAGNOSIS — I47.1 PAROXYSMAL SVT (SUPRAVENTRICULAR TACHYCARDIA) (HCC): ICD-10-CM

## 2019-06-25 RX ORDER — LOSARTAN POTASSIUM 100 MG/1
TABLET ORAL
Qty: 90 TAB | Refills: 2 | Status: SHIPPED | OUTPATIENT
Start: 2019-06-25 | End: 2019-12-23

## 2019-09-22 DIAGNOSIS — I48.92 PAROXYSMAL ATRIAL FLUTTER (HCC): ICD-10-CM

## 2019-09-22 DIAGNOSIS — I10 ESSENTIAL HYPERTENSION: ICD-10-CM

## 2019-09-22 DIAGNOSIS — I47.1 PAROXYSMAL SVT (SUPRAVENTRICULAR TACHYCARDIA) (HCC): ICD-10-CM

## 2019-09-23 RX ORDER — FLECAINIDE ACETATE 50 MG/1
TABLET ORAL
Qty: 180 TAB | Refills: 0 | Status: SHIPPED | OUTPATIENT
Start: 2019-09-23 | End: 2020-01-08

## 2019-12-23 DIAGNOSIS — I47.1 PAROXYSMAL SVT (SUPRAVENTRICULAR TACHYCARDIA) (HCC): ICD-10-CM

## 2019-12-23 DIAGNOSIS — I48.92 PAROXYSMAL ATRIAL FLUTTER (HCC): ICD-10-CM

## 2019-12-23 DIAGNOSIS — I10 ESSENTIAL HYPERTENSION: ICD-10-CM

## 2019-12-23 RX ORDER — LOSARTAN POTASSIUM 100 MG/1
TABLET ORAL
Qty: 90 TAB | Refills: 2 | Status: SHIPPED | OUTPATIENT
Start: 2019-12-23 | End: 2020-07-08 | Stop reason: ALTCHOICE

## 2020-01-08 ENCOUNTER — TELEPHONE (OUTPATIENT)
Dept: CARDIOLOGY CLINIC | Age: 85
End: 2020-01-08

## 2020-01-08 DIAGNOSIS — I47.1 PAROXYSMAL SVT (SUPRAVENTRICULAR TACHYCARDIA) (HCC): ICD-10-CM

## 2020-01-08 DIAGNOSIS — I48.92 PAROXYSMAL ATRIAL FLUTTER (HCC): ICD-10-CM

## 2020-01-08 DIAGNOSIS — I10 ESSENTIAL HYPERTENSION: ICD-10-CM

## 2020-01-08 RX ORDER — FLECAINIDE ACETATE 50 MG/1
TABLET ORAL
Qty: 60 TAB | Refills: 0 | Status: SHIPPED | OUTPATIENT
Start: 2020-01-08 | End: 2020-02-10

## 2020-01-08 NOTE — TELEPHONE ENCOUNTER
Refill approved VO   Requested Prescriptions     Pending Prescriptions Disp Refills    flecainide (TAMBOCOR) 50 mg tablet [Pharmacy Med Name: FLECAINIDE 50MG TABLETS] 60 Tab 0     Sig: TAKE 1 TABLET BY MOUTH TWICE DAILY

## 2020-02-08 DIAGNOSIS — I10 ESSENTIAL HYPERTENSION: ICD-10-CM

## 2020-02-08 DIAGNOSIS — I48.92 PAROXYSMAL ATRIAL FLUTTER (HCC): ICD-10-CM

## 2020-02-08 DIAGNOSIS — I47.1 PAROXYSMAL SVT (SUPRAVENTRICULAR TACHYCARDIA) (HCC): ICD-10-CM

## 2020-02-10 RX ORDER — FLECAINIDE ACETATE 50 MG/1
TABLET ORAL
Qty: 60 TAB | Refills: 0 | Status: SHIPPED | OUTPATIENT
Start: 2020-02-10 | End: 2020-02-27

## 2020-02-26 NOTE — PROGRESS NOTES
Raeann Montanez MD [facility]  [address]  [place], [address] [facility]    Office [phone]  Fax [phone]  Cell [phone]       Adin Cerrato is a 82 y.o. female last seen 11 months ago. Diagnoses    Encounter Diagnoses     ICD-10-CM ICD-9-CM   1. Paroxysmal atrial flutter (HCC) I48.92 427.32   2. Paroxysmal SVT (supraventricular tachycardia) (HCC) I47.1 427.0   3. Benign hypertensive heart disease without heart failure I11.9 402.10   4. Bilateral carotid artery stenosis I65.23 433.10     433.30   5. Systemic lupus erythematosus, unspecified SLE type, unspecified organ involvement status ([facility] M32.9 710.0       Assessment/Recommendations:    Long standing atrial arrhythmias (PAF, SVT) s/p SVT ablation by Dr. Isidro Alba at [address] [facility] 2015. No recurrent palpitations. I question the need for ongoing flecainide. She has to been anti-coagulated at her request  - Reduce flecainide 25 mg BID, see how she does  - Continues with antiplatelet therapy alone at her request.      HTN. Hypertensive in office today, uncertain control at home. She states BP normal in Nancy Isbell MD 's office. She is adherent on medications. Emphasized importance of home BP monitoring. Mild carotid disease with remote lacunar infarct. Duplex study today unchanged dating back to 2009. Recheck PRN.    Her functional capacity remains limited by fatigue and Lupus. She has chronic gait instability. F/u in 6 months. Follow-up and Dispositions    · Return in about 6 months (around 8/27/2020). Subjective:    Junie Burgos reports feeling okay. She noticed feeling tenseness in her body d/t high BP. She reports lots of stress in everyday life. She reports being very sedentary, she is limited by chronic fatigue and easy fatigability with even mild exertion. She reports unstable pattern of SOB, sometimes while walking. She sleeps while sitting up, reports episodes of SOB during sleep.  Patient denies any exertional chest pain, palpitations, syncope, orthopnea, edema or paroxysmal nocturnal dyspnea. She reports runny nose often. She reports crying everyday, likely d/t depression. She ambulates w/ a cane. She was diagnosed with systemic lupus, managed by Rachel Tong MD.    BP at home not recorded. She reports seeing Rachel Tong MD every few months. Of note, she has had multiple deaths in the family. Cardiac risk factors   HTN yes  DM  no  Smoking former,   Family hx of CAD no      Cardiac testing  Carotid duplex 4108 - GARRETT 97-96%, LICA 32-06%  Stress echo 2007 - 4:30, normal study  Carotid duplex 26/63/64 - 56-64% GARRETT, LICA 89-08%  Stress Echo 3/4/13 - normal study after submaximal exercise, diagnostic sensitivity limited by submaximal stress  Carotid 3/22/14-Occlusion and stenosis of carotid artery without mention of cerebral infarction: POA repeat bilateral artery doppler Consistent with <50% stenosis of the right internal carotid and 50-69% stenosis of the left internal carotid. Event Recorder 4/28/14, returned 6/3/14 - intermittent atrial flutter up to 151 bpm, PVCs  Echo 6/27/14: EF 65-70%; Left atrium - upper limits of normal.    Carotid duplex 4/1/47 - GARRETT 78-31%, LICA 67%, unchanged from 2014  Echo 4/12/16 - LVEF 65-70%, moderate MAC, mild MR, mild to moderate LAE  Carotid duplex 3/80/07 - GARRETT 82-61%, LICA 66%. Unchanged.   Echo 9/12/18 - LVH, EF 60%, moderate LAE  EKG 9/12/18 - SR 61, poor R wave progression, nonspecific STT abnormalities, no significant change from 5/24/17  Carotid Duplex 2/27/20 - 10-49 bilateral, unchanged from 2016      Past Medical History:   Diagnosis Date    Benign hypertensive heart disease without heart failure     Cancer ([address]     skin    Carotid disease, bilateral ([address]     Hypothyroidism     Lacunar stroke 2004    Lupus     Paroxysmal atrial flutter ([address] 9/29/2014    Paroxysmal supraventricular tachycardia (HCC)     Systemic lupus erythematosus ([address]         Social History     Socioeconomic History    Marital status:      Spouse name: Not on file    Number of children: Not on file    Years of education: Not on file    Highest education level: Not on file   Tobacco Use    Smoking status: Former Smoker     Types: Cigarettes    Smokeless tobacco: Never Used   Substance and Sexual Activity    Alcohol use: No    Drug use: No   Social History Narrative    . Remote smoker       Current Outpatient Medications   Medication Sig Dispense Refill    flecainide (TAMBOCOR) 50 mg tablet TAKE 1 TABLET BY MOUTH TWICE DAILY 60 Tab 0    losartan (COZAAR) 100 mg tablet TAKE 1 TABLET BY MOUTH DAILY 90 Tab 2    aspirin-dipyridamole (AGGRENOX)  mg per SR capsule Take 1 Cap by mouth two (2) times a day.  levothyroxine (SYNTHROID) 100 mcg tablet Take  by mouth Daily (before breakfast).  alum-mag hydroxide-simeth (MYLANTA) 200-200-20 mg/5 mL susp Take 30 mL by mouth daily.  erythromycin (ILOTYCIN) ophthalmic ointment Administer  to both eyes nightly.  acetaminophen (TYLENOL EXTRA STRENGTH) 500 mg tablet Take  by mouth every six (6) hours as needed for Pain.  omeprazole (PRILOSEC) 40 mg capsule Take 40 mg by mouth two (2) times a day.  propranolol LA (INDERAL LA) 160 mg capsule Take 160 mg by mouth daily.  lorazepam (ATIVAN) 1 mg tablet Take 1 mg by mouth two (2) times a day.          Allergies   Allergen Reactions    Cephalosporins Unknown (comments)    Flagyl [Metronidazole] Unknown (comments)    Iodine Unknown (comments)    Ivp Dye [Fd And C Blue No.1] Unknown (comments)    Keflex [Cephalexin] Unknown (comments)    Levaquin [Levofloxacin] Unknown (comments)    Morphine Other (comments)     Hallucinations, \"crazy\"    Pcn [Penicillins] Unknown (comments)    Sulfa (Sulfonamide Antibiotics) Unknown (comments)    Tetanus Vaccines And Toxoid Unknown (comments)    Tetracycline Unknown (comments) Review of Symptoms:  Constitutional: Negative for fever, chills, malaise and diaphoresis. +fatigue  Respiratory: Negative for cough, hemoptysis, sputum production, and wheezing. Cardiovascular: Negative for chest pain, palpitations, orthopnea, claudication, leg swelling and PND. +SOB  Gastrointestinal: Negative for heartburn, nausea, vomiting, blood in stool and melena. Genitourinary: Negative for dysuria and flank pain. Musculoskeletal: Negative for joint pain and back pain. Skin: Negative for rash. Neurological: Negative for focal weakness, seizures, loss of consciousness, weakness and headaches. Endo/Heme/Allergies: Does not bruise/bleed easily. Psychiatric/Behavioral: Negative for memory loss. The patient does not have insomnia. Physical Exam  Visit Vitals  BP (!) 200/100   Pulse 64   Resp 20   Ht 5' 7\" (1.702 m)   Wt 129 lb 9.6 oz (58.8 kg)   SpO2 99%   BMI 20.30 kg/m²     Wt Readings from Last 3 Encounters:   02/27/20 129 lb 9.6 oz (58.8 kg)   02/27/20 132 lb (59.9 kg)   03/06/19 132 lb 3.2 oz (60 kg)     General - WDWN  HEENT: Eyes without jaundice, Hearing intact  Neck - JVP normal, thyroid nl  Cardiac - normal S1,S2, no murmurs, rubs or gallops.  No clicks  Vascular - carotids without bruits, radials, femorals and pedal pulses equal bilateral  Lungs - clear to auscultation bilaterals, no rales ,wheezing or rhonchi  Abd - soft nontender, no HSM, no abd bruits  Extremities - no edema  Neuro - nonfocal, normal gait  Psych - mood and affect normal    Labs:      Cardiographics  EKG 3/3/1/15 - SR 70, NSSTT abnormalities   EKG 1/7/16 - SR 63, normal  EKG 4/12/16 - SR 62, normal   EKG 11/15/16 - SR 66, unchanged  EKG 5/24/17 - SR 61, unchanged  Echo 9/12/18 - LVH, EF 60%, moderate LAE  EKG 9/12/18 - SR 61, poor R wave progression, nonspecific STT abnormalities, no significant change from 5/24/17  EKG 2/27/20 - SR 64, PRWP, otherwise normal, no significant change from 9/12/18  Carotid Duplex 2/27/20 - 10-49 bilateral, unchanged from 2016        Written by Juan M Roberts. Lorena Pierce, as dictated by Dany Esparza M.D.      Dany Esparza MD

## 2020-02-27 ENCOUNTER — OFFICE VISIT (OUTPATIENT)
Dept: CARDIOLOGY CLINIC | Age: 85
End: 2020-02-27

## 2020-02-27 VITALS
RESPIRATION RATE: 20 BRPM | SYSTOLIC BLOOD PRESSURE: 200 MMHG | HEIGHT: 67 IN | WEIGHT: 129.6 LBS | BODY MASS INDEX: 20.34 KG/M2 | HEART RATE: 64 BPM | DIASTOLIC BLOOD PRESSURE: 100 MMHG | OXYGEN SATURATION: 99 %

## 2020-02-27 DIAGNOSIS — I47.1 PAROXYSMAL SVT (SUPRAVENTRICULAR TACHYCARDIA) (HCC): ICD-10-CM

## 2020-02-27 DIAGNOSIS — I48.92 PAROXYSMAL ATRIAL FLUTTER (HCC): Primary | ICD-10-CM

## 2020-02-27 DIAGNOSIS — M32.9 SYSTEMIC LUPUS ERYTHEMATOSUS, UNSPECIFIED SLE TYPE, UNSPECIFIED ORGAN INVOLVEMENT STATUS (HCC): ICD-10-CM

## 2020-02-27 DIAGNOSIS — I65.23 BILATERAL CAROTID ARTERY STENOSIS: ICD-10-CM

## 2020-02-27 DIAGNOSIS — I11.9 BENIGN HYPERTENSIVE HEART DISEASE WITHOUT HEART FAILURE: ICD-10-CM

## 2020-02-27 RX ORDER — FLECAINIDE ACETATE 50 MG/1
25 TABLET ORAL 2 TIMES DAILY
Qty: 60 TAB | Refills: 0
Start: 2020-02-27 | End: 2020-03-10

## 2020-02-27 NOTE — PATIENT INSTRUCTIONS
- Please monitor blood pressure at home and record it on a log to bring to your next visit. - Reduce Flecainide to half tablet twice daily, if you are doing fine in 6 months we will consider stopping it.

## 2020-02-27 NOTE — PROGRESS NOTES
Visit Vitals  BP (!) 200/100   Pulse 64   Resp 20   Ht 5' 7\" (1.702 m)   Wt 129 lb 9.6 oz (58.8 kg)   SpO2 99%   BMI 20.30 kg/m²     Rechecked /104  EKG today  Carotid duplex

## 2020-02-27 NOTE — LETTER
2/27/20 Patient: Brandy Weldon YOB: 1935 Date of Visit: 2/27/2020 MD DENIA Gomez/ [address] [facility] [address] [address] VIA Facsimile: [phone] Dear Latha Lopez MD, Thank you for referring Ms. Sona Burgos to [facility] for evaluation. My notes for this consultation are attached. If you have questions, please do not hesitate to call me. I look forward to following your patient along with you. Sincerely, Sabrina Lees MD

## 2020-03-09 ENCOUNTER — TELEPHONE (OUTPATIENT)
Dept: CARDIOLOGY CLINIC | Age: 85
End: 2020-03-09

## 2020-03-09 DIAGNOSIS — I48.92 PAROXYSMAL ATRIAL FLUTTER (HCC): ICD-10-CM

## 2020-03-09 DIAGNOSIS — I47.1 PAROXYSMAL SVT (SUPRAVENTRICULAR TACHYCARDIA) (HCC): ICD-10-CM

## 2020-03-09 NOTE — TELEPHONE ENCOUNTER
Patient would like to speak with [facility] regarding Flecainide. She states that since cutting her pills in half, she has been having an irregular heart rate frequently. She was originally taking 50 mg twice a day and has cut back to 25 mg twice a day.      Phone: [phone]

## 2020-03-09 NOTE — TELEPHONE ENCOUNTER
Beverly [employer]rian[employer] stated continues to have palpitations that give her SOB and fatigue. Instructed to increase flecainide to 50mg in AM and 25mg in PM. Patient having difficulty cutting pills in half. Unable to order 25mg tabs.     Patient will call back in 1 week with update

## 2020-04-06 RX ORDER — LORAZEPAM 1 MG/1
TABLET ORAL
Qty: 180 TAB | OUTPATIENT
Start: 2020-04-06

## 2020-07-08 ENCOUNTER — OFFICE VISIT (OUTPATIENT)
Dept: CARDIOLOGY CLINIC | Age: 85
End: 2020-07-08

## 2020-07-08 VITALS
WEIGHT: 122.8 LBS | SYSTOLIC BLOOD PRESSURE: 180 MMHG | RESPIRATION RATE: 20 BRPM | BODY MASS INDEX: 19.27 KG/M2 | DIASTOLIC BLOOD PRESSURE: 100 MMHG | OXYGEN SATURATION: 99 % | HEART RATE: 72 BPM | HEIGHT: 67 IN

## 2020-07-08 DIAGNOSIS — I65.23 BILATERAL CAROTID ARTERY STENOSIS: ICD-10-CM

## 2020-07-08 DIAGNOSIS — I11.9 BENIGN HYPERTENSIVE HEART DISEASE WITHOUT HEART FAILURE: ICD-10-CM

## 2020-07-08 DIAGNOSIS — I48.92 PAROXYSMAL ATRIAL FLUTTER (HCC): Primary | ICD-10-CM

## 2020-07-08 RX ORDER — AMLODIPINE BESYLATE 2.5 MG/1
TABLET ORAL DAILY
COMMUNITY

## 2020-07-08 NOTE — PROGRESS NOTES
Visit Vitals  BP (!) 190/110   Pulse 72   Resp 20   Ht 5' 7\" (1.702 m)   Wt 122 lb 12.8 oz (55.7 kg)   SpO2 99%   BMI 19.23 kg/m²     BP rechecked 180/100  EKG  Patient continues to complain of dizziness

## 2020-07-08 NOTE — LETTER
7/18/20 Patient: Maria Del Carmen Sandhu YOB: 1935 Date of Visit: 7/8/2020 MD DENIA Kimball/ [address] [facility] [address] [address] VIA Facsimile: [phone] Dear Guillermo Diaz MD, Thank you for referring Ms. Sona Burgos to [facility] for evaluation. My notes for this consultation are attached. If you have questions, please do not hesitate to call me. I look forward to following your patient along with you. Sincerely, Melvi Phelan MD

## 2020-07-08 NOTE — PROGRESS NOTES
Raeann Loomis MD [facility]  [address]  [place], [address] [facility]    Office [phone]  Fax [phone]  Cell [phone]       Alex Enrique is a 82 y.o. female last seen 5 months ago. Diagnoses    Encounter Diagnoses     ICD-10-CM ICD-9-CM   1. Paroxysmal atrial flutter (HCC)  I48.92 427.32   2. Benign hypertensive heart disease without heart failure  I11.9 402.10   3. Bilateral carotid artery stenosis  I65.23 433.10     433.30       Assessment/Recommendations:    Long standing atrial arrhythmias (PAF, SVT) s/p SVT ablation by Dr. Amol Us at [address] [facility] 2015. No recurrent palpitations. She has not been anti-coagulated at her request.  - Continue flecainide 50/25 mg BID, see how she does  - Continue aggrenox (her preference)       Chronic HTN, suboptimal control. BP has been high in office visits, uncertain control at home. Her medications have been adjusted recently by Dr. Valente [employer], possibly d/t hyponatremia. - Emphasized importance of home BP monitoring - BID  - Continue amlodipine 2.5 + imdural 160  - F/u with Dr. Valente [employer] w/in 1 month    Mild carotid disease with remote lacunar infarct. Duplex study Feb 2020 unchanged dating back to 2009. Recheck PRN.    Her functional capacity remains limited by fatigue, Lupus, and chronic gait instability. She is now living in [facility]. F/u in 6 months. Follow-up and Dispositions    · Return in about 6 months (around 1/8/2021). Subjective:    Aishwarya Burgos reports feeling okay. She reports BP elevated for past few months. Losartan was discontinued then replaced with metoprolol, then clonidine, and she is now now amlodipine. She reports infrequent head aches. She reports being very sedentary, she is limited by chronic fatigue and easy fatigability with even mild exertion. She reports unstable pattern of SOB, sometimes while walking. She sleeps while sitting up, reports episodes of SOB during sleep.  Patient denies any exertional chest pain, palpitations, syncope, orthopnea, edema or paroxysmal nocturnal dyspnea. She apparently has hyponatremia and is on fluid restriction by Manuel Richard MD.     BP last week was 190 to 165/88, BP not checked at home. She reports seeing Manuel Richard MD every few months. She ambulates with a cane and she reports balance issues and falls. She was diagnosed with systemic lupus, managed by Manuel Richard MD.    Of note, she recently moved, she has someone to drive her to appointments. She had plans to move to [place] but has reconsidered in light of COVID-19 issues. She is a fairly vague historian. Cardiac risk factors   HTN yes  DM  no  Smoking former,   Family hx of CAD no      Cardiac testing  Carotid duplex 5985 - GARRETT 54-71%, LICA 68-26%  Stress echo 2007 - 4:30, normal study  Carotid duplex 46/73/30 - 13-09% GARRETT, LICA 45-18%  Stress Echo 3/4/13 - normal study after submaximal exercise, diagnostic sensitivity limited by submaximal stress  Carotid 3/22/14-Occlusion and stenosis of carotid artery without mention of cerebral infarction: POA repeat bilateral artery doppler Consistent with <50% stenosis of the right internal carotid and 50-69% stenosis of the left internal carotid. Event Recorder 4/28/14, returned 6/3/14 - intermittent atrial flutter up to 151 bpm, PVCs  Echo 6/27/14: EF 65-70%; Left atrium - upper limits of normal.    Carotid duplex 9/7/76 - GARRETT 98-99%, LICA 50%, unchanged from 2014  Echo 4/12/16 - LVEF 65-70%, moderate MAC, mild MR, mild to moderate LAE  Carotid duplex 3/26/48 - GARRETT 91-48%, LICA 58%. Unchanged.   Echo 9/12/18 - LVH, EF 60%, moderate LAE  EKG 9/12/18 - SR 61, poor R wave progression, nonspecific STT abnormalities, no significant change from 5/24/17  Carotid Duplex 2/27/20 - 10-49 bilateral, unchanged from 2016      Past Medical History:   Diagnosis Date    Benign hypertensive heart disease without heart failure     Cancer ([facility]     skin  Carotid disease, bilateral ([facility]     Hypothyroidism     Lacunar stroke ([facility] 2004    Lupus ([facility]     Paroxysmal atrial flutter ([facility] 9/29/2014    Paroxysmal supraventricular tachycardia (HCC)     Systemic lupus erythematosus ([facility]         Social History     Socioeconomic History    Marital status:      Spouse name: Not on file    Number of children: Not on file    Years of education: Not on file    Highest education level: Not on file   Tobacco Use    Smoking status: Former Smoker     Types: Cigarettes    Smokeless tobacco: Never Used   Substance and Sexual Activity    Alcohol use: No    Drug use: No   Social History Narrative    . Remote smoker       Current Outpatient Medications   Medication Sig Dispense Refill    amLODIPine (NORVASC) 2.5 mg tablet Take  by mouth daily.  flecainide (TAMBOCOR) 50 mg tablet 50 mg in AM and 25 mg in PM. Additional doses PRN. 60 Tab 3    aspirin-dipyridamole (AGGRENOX)  mg per SR capsule Take 1 Cap by mouth two (2) times a day.  levothyroxine (SYNTHROID) 100 mcg tablet Take  by mouth Daily (before breakfast).  alum-mag hydroxide-simeth (MYLANTA) 200-200-20 mg/5 mL susp Take 30 mL by mouth daily.  erythromycin (ILOTYCIN) ophthalmic ointment Administer  to both eyes nightly.  acetaminophen (TYLENOL EXTRA STRENGTH) 500 mg tablet Take  by mouth every six (6) hours as needed for Pain.  omeprazole (PRILOSEC) 40 mg capsule Take 40 mg by mouth two (2) times a day.  propranolol LA (INDERAL LA) 160 mg capsule Take 160 mg by mouth daily.  lorazepam (ATIVAN) 1 mg tablet Take 1 mg by mouth two (2) times a day.          Allergies   Allergen Reactions    Cephalosporins Unknown (comments)    Flagyl [Metronidazole] Unknown (comments)    Iodine Unknown (comments)    Ivp Dye [Fd And C Blue No.1] Unknown (comments)    Keflex [Cephalexin] Unknown (comments)    Levaquin [Levofloxacin] Unknown (comments)    Morphine Other (comments)     Hallucinations, \"crazy\"    Pcn [Penicillins] Unknown (comments)    Sulfa (Sulfonamide Antibiotics) Unknown (comments)    Tetanus Vaccines And Toxoid Unknown (comments)    Tetracycline Unknown (comments)          Review of Symptoms:  Constitutional: Negative for fever, chills, malaise and diaphoresis. +fatigue, balance issues  Respiratory: Negative for cough, hemoptysis, sputum production, and wheezing. Cardiovascular: Negative for chest pain, palpitations, orthopnea, claudication, leg swelling and PND. +SOB  Gastrointestinal: Negative for heartburn, nausea, vomiting, blood in stool and melena. Genitourinary: Negative for dysuria and flank pain. Musculoskeletal: Negative for joint pain and back pain. Skin: Negative for rash. Neurological: Negative for focal weakness, seizures, loss of consciousness, weakness and headaches. Endo/Heme/Allergies: Does not bruise/bleed easily. Psychiatric/Behavioral: Negative for memory loss. The patient does not have insomnia. Physical Exam  Visit Vitals  BP (!) 180/100   Pulse 72   Resp 20   Ht 5' 7\" (1.702 m)   Wt 122 lb 12.8 oz (55.7 kg)   SpO2 99%   BMI 19.23 kg/m²     Wt Readings from Last 3 Encounters:   07/08/20 122 lb 12.8 oz (55.7 kg)   02/27/20 129 lb 9.6 oz (58.8 kg)   02/27/20 132 lb (59.9 kg)     General - WDWN  HEENT: Eyes without jaundice, Hearing intact  Neck - JVP normal, thyroid nl  Cardiac - normal S1,S2, no murmurs, rubs or gallops.  No clicks  Vascular - carotids without bruits, radials, femorals and pedal pulses equal bilateral  Lungs - clear to auscultation bilaterals, no rales ,wheezing or rhonchi  Abd - soft nontender, no HSM, no abd bruits  Extremities - no edema  Neuro - nonfocal, normal gait  Psych - mood and affect normal    Labs:      Cardiographics  EKG 3/3/1/15 - SR 70, NSSTT abnormalities   EKG 1/7/16 - SR 63, normal  EKG 4/12/16 - SR 62, normal   EKG 11/15/16 - SR 66, unchanged  EKG 5/24/17 - SR 61, unchanged  Echo 9/12/18 - LVH, EF 60%, moderate LAE  EKG 9/12/18 - SR 61, poor R wave progression, nonspecific STT abnormalities, no significant change from 5/24/17  EKG 2/27/20 - SR 64, PRWP, otherwise normal, no significant change from 9/12/18  Carotid Duplex 2/27/20 - 10-49 bilateral, unchanged from 2016        Written by Lauri Saenz. Leora Claudio, as dictated by Chinyere Haque M.D.      Chinyere Haque MD

## 2020-07-17 ENCOUNTER — TELEPHONE (OUTPATIENT)
Dept: CARDIOLOGY CLINIC | Age: 85
End: 2020-07-17

## 2020-07-17 NOTE — TELEPHONE ENCOUNTER
PTIDX2   BP readings for 7days since LOV  150/76     129/56  196/112   151/93  150/74       143/83     150/90  139/68     158/78  142/66         Hr 64/min     advise

## 2020-07-28 DIAGNOSIS — I47.1 PAROXYSMAL SVT (SUPRAVENTRICULAR TACHYCARDIA) (HCC): ICD-10-CM

## 2020-07-28 DIAGNOSIS — I48.92 PAROXYSMAL ATRIAL FLUTTER (HCC): ICD-10-CM

## 2020-07-28 RX ORDER — FLECAINIDE ACETATE 50 MG/1
TABLET ORAL
Qty: 60 TAB | Refills: 3 | Status: SHIPPED | OUTPATIENT
Start: 2020-07-28 | End: 2021-01-11

## 2020-12-03 ENCOUNTER — TELEPHONE (OUTPATIENT)
Dept: CARDIOLOGY CLINIC | Age: 85
End: 2020-12-03

## 2020-12-03 NOTE — TELEPHONE ENCOUNTER
Pt would like to speak with Shena Drew in regards to Dr. Martell Rape exiting the practice. Pt inquiring what do she do.  Please advise      Phone:[phone]

## 2021-04-06 DIAGNOSIS — I47.1 PAROXYSMAL SVT (SUPRAVENTRICULAR TACHYCARDIA) (HCC): ICD-10-CM

## 2021-04-06 DIAGNOSIS — I48.92 PAROXYSMAL ATRIAL FLUTTER (HCC): ICD-10-CM

## 2021-04-12 RX ORDER — FLECAINIDE ACETATE 50 MG/1
TABLET ORAL
Qty: 60 TAB | Refills: 0 | Status: SHIPPED | OUTPATIENT
Start: 2021-04-12 | End: 2021-05-20

## 2021-05-20 DIAGNOSIS — I47.1 PAROXYSMAL SVT (SUPRAVENTRICULAR TACHYCARDIA) (HCC): ICD-10-CM

## 2021-05-20 DIAGNOSIS — I48.92 PAROXYSMAL ATRIAL FLUTTER (HCC): ICD-10-CM

## 2021-05-20 RX ORDER — FLECAINIDE ACETATE 50 MG/1
TABLET ORAL
Qty: 60 TABLET | Refills: 0 | Status: SHIPPED | OUTPATIENT
Start: 2021-05-20 | End: 2021-06-29 | Stop reason: SDUPTHER

## 2021-06-22 DIAGNOSIS — I48.92 PAROXYSMAL ATRIAL FLUTTER (HCC): ICD-10-CM

## 2021-06-22 DIAGNOSIS — I47.1 PAROXYSMAL SVT (SUPRAVENTRICULAR TACHYCARDIA) (HCC): ICD-10-CM

## 2021-06-28 RX ORDER — FLECAINIDE ACETATE 50 MG/1
TABLET ORAL
Qty: 60 TABLET | Refills: 0 | OUTPATIENT
Start: 2021-06-28

## 2021-06-29 RX ORDER — FLECAINIDE ACETATE 50 MG/1
TABLET ORAL
Qty: 60 TABLET | Refills: 0 | Status: SHIPPED | OUTPATIENT
Start: 2021-06-29 | End: 2021-08-04 | Stop reason: SDUPTHER

## 2022-03-20 PROBLEM — M32.9 SYSTEMIC LUPUS ERYTHEMATOSUS (HCC): Status: ACTIVE | Noted: 2020-02-27

## 2023-05-12 RX ORDER — ACETAMINOPHEN 500 MG
TABLET ORAL EVERY 6 HOURS PRN
COMMUNITY

## 2023-05-12 RX ORDER — FLECAINIDE ACETATE 50 MG/1
TABLET ORAL
COMMUNITY
Start: 2021-08-04

## 2023-05-12 RX ORDER — ASPIRIN AND DIPYRIDAMOLE 25; 200 MG/1; MG/1
1 CAPSULE, EXTENDED RELEASE ORAL 2 TIMES DAILY
COMMUNITY

## 2023-05-12 RX ORDER — PROPRANOLOL HYDROCHLORIDE 160 MG/1
160 CAPSULE, EXTENDED RELEASE ORAL DAILY
COMMUNITY

## 2023-05-12 RX ORDER — LORAZEPAM 1 MG/1
TABLET ORAL 2 TIMES DAILY
COMMUNITY
Start: 2010-11-10

## 2023-05-12 RX ORDER — AMLODIPINE BESYLATE 2.5 MG/1
TABLET ORAL DAILY
COMMUNITY

## 2023-05-12 RX ORDER — MAGNESIUM HYDROXIDE/ALUMINUM HYDROXICE/SIMETHICONE 120; 1200; 1200 MG/30ML; MG/30ML; MG/30ML
SUSPENSION ORAL DAILY
COMMUNITY

## 2023-05-12 RX ORDER — LEVOTHYROXINE SODIUM 0.1 MG/1
TABLET ORAL
COMMUNITY

## 2023-05-12 RX ORDER — OMEPRAZOLE 40 MG/1
CAPSULE, DELAYED RELEASE ORAL 2 TIMES DAILY
COMMUNITY

## 2023-05-12 RX ORDER — ERYTHROMYCIN 5 MG/G
OINTMENT OPHTHALMIC
COMMUNITY